# Patient Record
Sex: MALE | Race: OTHER | NOT HISPANIC OR LATINO
[De-identification: names, ages, dates, MRNs, and addresses within clinical notes are randomized per-mention and may not be internally consistent; named-entity substitution may affect disease eponyms.]

---

## 2021-07-01 PROBLEM — Z00.00 ENCOUNTER FOR PREVENTIVE HEALTH EXAMINATION: Status: ACTIVE | Noted: 2021-07-01

## 2021-07-09 ENCOUNTER — APPOINTMENT (OUTPATIENT)
Dept: UROLOGY | Facility: CLINIC | Age: 36
End: 2021-07-09
Payer: COMMERCIAL

## 2021-07-09 VITALS
HEIGHT: 68 IN | BODY MASS INDEX: 39.1 KG/M2 | WEIGHT: 258 LBS | HEART RATE: 74 BPM | SYSTOLIC BLOOD PRESSURE: 119 MMHG | DIASTOLIC BLOOD PRESSURE: 74 MMHG

## 2021-07-09 DIAGNOSIS — Z82.49 FAMILY HISTORY OF ISCHEMIC HEART DISEASE AND OTHER DISEASES OF THE CIRCULATORY SYSTEM: ICD-10-CM

## 2021-07-09 DIAGNOSIS — Z78.9 OTHER SPECIFIED HEALTH STATUS: ICD-10-CM

## 2021-07-09 PROCEDURE — 99204 OFFICE O/P NEW MOD 45 MIN: CPT

## 2021-07-09 NOTE — PHYSICAL EXAM
[General Appearance - Well Developed] : well developed [General Appearance - Well Nourished] : well nourished [Normal Appearance] : normal appearance [Well Groomed] : well groomed [General Appearance - In No Acute Distress] : no acute distress [Edema] : no peripheral edema [Respiration, Rhythm And Depth] : normal respiratory rhythm and effort [Exaggerated Use Of Accessory Muscles For Inspiration] : no accessory muscle use [Abdomen Soft] : soft [Abdomen Tenderness] : non-tender [Costovertebral Angle Tenderness] : no ~M costovertebral angle tenderness [Urethral Meatus] : meatus normal [Penis Abnormality] : normal circumcised penis [Urinary Bladder Findings] : the bladder was normal on palpation [Scrotum] : the scrotum was normal [Testes Tenderness] : no tenderness of the testes [Testes Mass (___cm)] : there were no testicular masses [Normal Station and Gait] : the gait and station were normal for the patient's age [] : no rash [No Focal Deficits] : no focal deficits [Oriented To Time, Place, And Person] : oriented to person, place, and time [Affect] : the affect was normal [Mood] : the mood was normal [Not Anxious] : not anxious [Femoral Lymph Nodes Enlarged Bilaterally] : femoral [Inguinal Lymph Nodes Enlarged Bilaterally] : inguinal [FreeTextEntry1] : Testicles 22 cc by Prader

## 2021-07-09 NOTE — END OF VISIT
[FreeTextEntry3] : I participated in the pain history, physical exam and formulating the treatment plan and agree with the above transcription by the physician's assistant

## 2021-07-09 NOTE — ASSESSMENT
[FreeTextEntry1] : History of infertility having undergone several IUI treatments as well as hCG/clomid in the past.\par They have a long Will undergo full hormone evaluation as well as scrotal ultrasound. He will obtain a semen analysis and followup for review

## 2021-07-09 NOTE — HISTORY OF PRESENT ILLNESS
[None] : no symptoms [FreeTextEntry1] : Natalio is a 35-year-old male who presents for evaluation of infertility. He has been  to his wife 32 years old , for 15 years and had been trying to conceive for approximately 6 years.\par \par 3 years ago and living in [facility] and underwent a fertility workup there. His wife states that he had poor semen quality and was started on Clomid and what we believe was hCG injections. They stated semen analysis parameters improved significantly however they had to discontinue treatment signature family issues.\par \par They have since underwent IUI. x2, most recently 2 weeks ago, which unfortunately did not result of conception. His wife states that the last post wash semen analysis was very poor although she does not have the results.\par \par She states that she has an irregular cycle and is being followed by gynecology. Additionally his sister had difficulty conceiving and she was born with one ovary and had to undergo IVF

## 2021-09-28 ENCOUNTER — APPOINTMENT (OUTPATIENT)
Dept: UROLOGY | Facility: CLINIC | Age: 36
End: 2021-09-28
Payer: COMMERCIAL

## 2021-09-28 PROCEDURE — 99214 OFFICE O/P EST MOD 30 MIN: CPT | Mod: 95

## 2021-09-28 NOTE — ASSESSMENT
[FreeTextEntry1] : Low semen volume.  Recommend transrectal ultrasound to check for any dilation of the ejaculatory ducts.  Will review blood work when they can tell us which lab he had it drawn at

## 2021-09-28 NOTE — HISTORY OF PRESENT ILLNESS
[FreeTextEntry1] : 36-year-old with inability to conceive with his wife.  He went for testicular ultrasound which showed normal testicles and 3 mm paratesticular veins but no other characterization of them.  Semen parameters include low volume, low total count and low motility.  He had blood work but he does not know which lab and we have not received the results

## 2021-10-12 ENCOUNTER — TRANSCRIPTION ENCOUNTER (OUTPATIENT)
Age: 36
End: 2021-10-12

## 2021-10-13 ENCOUNTER — APPOINTMENT (OUTPATIENT)
Dept: UROLOGY | Facility: CLINIC | Age: 36
End: 2021-10-13
Payer: COMMERCIAL

## 2021-10-13 PROCEDURE — 76872 US TRANSRECTAL: CPT

## 2021-10-20 ENCOUNTER — APPOINTMENT (OUTPATIENT)
Dept: UROLOGY | Facility: CLINIC | Age: 36
End: 2021-10-20
Payer: COMMERCIAL

## 2021-10-20 VITALS
DIASTOLIC BLOOD PRESSURE: 78 MMHG | SYSTOLIC BLOOD PRESSURE: 125 MMHG | WEIGHT: 270 LBS | BODY MASS INDEX: 40.92 KG/M2 | HEART RATE: 92 BPM | HEIGHT: 68 IN | TEMPERATURE: 98 F

## 2021-10-20 DIAGNOSIS — Z80.1 FAMILY HISTORY OF MALIGNANT NEOPLASM OF TRACHEA, BRONCHUS AND LUNG: ICD-10-CM

## 2021-10-20 DIAGNOSIS — F17.200 NICOTINE DEPENDENCE, UNSPECIFIED, UNCOMPLICATED: ICD-10-CM

## 2021-10-20 PROCEDURE — 99215 OFFICE O/P EST HI 40 MIN: CPT

## 2021-10-20 NOTE — PHYSICAL EXAM
[General Appearance - Well Developed] : well developed [General Appearance - Well Nourished] : well nourished [Normal Appearance] : normal appearance [Well Groomed] : well groomed [General Appearance - In No Acute Distress] : no acute distress [Abdomen Soft] : soft [Abdomen Tenderness] : non-tender [Costovertebral Angle Tenderness] : no ~M costovertebral angle tenderness [Penis Abnormality] : normal uncircumcised penis [Epididymis] : the epididymides were normal [Testes Tenderness] : no tenderness of the testes [Heart Rate And Rhythm] : Heart rate and rhythm were normal [Edema] : no peripheral edema [] : no respiratory distress [Respiration, Rhythm And Depth] : normal respiratory rhythm and effort [Exaggerated Use Of Accessory Muscles For Inspiration] : no accessory muscle use [Auscultation Breath Sounds / Voice Sounds] : lungs were clear to auscultation bilaterally [Oriented To Time, Place, And Person] : oriented to person, place, and time [Affect] : the affect was normal [Mood] : the mood was normal [Not Anxious] : not anxious [Normal Station and Gait] : the gait and station were normal for the patient's age [FreeTextEntry1] : Normal deep tendon reflexes

## 2021-10-20 NOTE — LETTER BODY
[Dear  ___] : Dear  [unfilled], [Courtesy Letter:] : I had the pleasure of seeing your patient, [unfilled], in my office today. [Please see my note below.] : Please see my note below. [Sincerely,] : Sincerely, [FreeTextEntry2] : Ada Valencia MD \par [address] [facility]\[place] [address]

## 2021-10-20 NOTE — LETTER HEADER
[FreeTextEntry3] : Leilani Farah M.D.\par Director of Urology\par [facility]\par [address] [place] [address], [address]\[place] [address]

## 2021-10-20 NOTE — HISTORY OF PRESENT ILLNESS
[FreeTextEntry1] : Natalio is a 36-year-old Bhutanese male who has been with Magui a 32-year-old also Bhut[place], female for 16 years.  For the last 8 or 9 years he has not used birth control unfortunately she has as of yet not conceived.  Even if that has had other partners with whom they could have had a pregnancy, they have had initial evaluation by Dr. Millan and in essence here for subspecialty opinion.  \par \par Testing to date included an ultrasound done on July 16, 2021 at [facility] unfortunately it was only done supine that showed right-sided veins that go up to 3 mm they did not discuss the degree of reflux and left-sided veins that go up to 2 mm again it was only done supine\par \par Transrectal ultrasound was done here in the office on October 13, 2021 showed a 25 g gland normal parameters no evidence of ejaculatory duct dilation\par \par Semen analysis was done at [facility] on September 16, 2021.  It showed a\par Low volume of 0.9 mL\par Low count of 26.6 million with a\par Normal concentration of 29.6 million\par Low motility at 11%\par Low grade of motility with rapid at 7 and slow at 5%.\par Morphology was borderline at 12% 4% were tapered there were no precursors.\par Palmer morphology was only 3%.\par \par There is no evidence of any erectile dysfunction.  They have sex anywhere from once a week to perhaps 2 or 3 times per week however she has PCOS and they do not know when she is ovulating so they can time there marital relations.  They had IUI\par \par They did IUI twice with her ovarian hyperstimulation unfortunately both times they were male factor and they proceeded with it regardless unfortunately was not successful the question is what can we do to improve his sperm or do they have to go to more advanced reproductive technology\par \par He has no other urologic issues no blood no pus no infection\par \par He smokes a pack every day or day and a half he knows that this is deleterious to his sperm we also discussed how it adversely affects erections\par \par When he was in [facility] he was taking Clomid I do not know the dose twice a day as well as getting a shot every 3 days and his sperm volume and everything else improved.

## 2021-10-20 NOTE — ASSESSMENT
[FreeTextEntry1] : The ultrasound was done with him supine and going to have it repeated supine and upright without and with Valsalva.  \par \par She is almost 33 so the time is not gone but time is an issue at this point.  His testosterone was low normal and they tell me Clomid worked in the past I see no reason not to try it again 1 we will look to see if he indeed has a clinical varicocele.\par \par He will go for morning bloods because the bloods we have a several months old and I want to get a second sample to make sure as well and like a bioavailable.  He will then start on Clomid 50 mg every other day he will get blood again in 3 weeks he will see me in 4 we will go over the bloods before and then on the Clomid we will go over the repeat ultrasound and they will try to get me the semen analysis from when they did the intrauterine inseminations as wall we have now is just 1 sample in 1 sample was not enough on which to make a decision

## 2021-11-22 ENCOUNTER — APPOINTMENT (OUTPATIENT)
Dept: UROLOGY | Facility: CLINIC | Age: 36
End: 2021-11-22
Payer: COMMERCIAL

## 2021-11-22 VITALS
HEART RATE: 81 BPM | BODY MASS INDEX: 41.68 KG/M2 | DIASTOLIC BLOOD PRESSURE: 81 MMHG | SYSTOLIC BLOOD PRESSURE: 123 MMHG | WEIGHT: 275 LBS | HEIGHT: 68 IN

## 2021-11-22 PROCEDURE — 99214 OFFICE O/P EST MOD 30 MIN: CPT

## 2021-11-29 ENCOUNTER — OUTPATIENT (OUTPATIENT)
Dept: OUTPATIENT SERVICES | Facility: HOSPITAL | Age: 36
LOS: 1 days | Discharge: HOME | End: 2021-11-29
Payer: MEDICAID

## 2021-11-29 ENCOUNTER — RESULT REVIEW (OUTPATIENT)
Age: 36
End: 2021-11-29

## 2021-11-29 ENCOUNTER — APPOINTMENT (OUTPATIENT)
Dept: UROLOGY | Facility: CLINIC | Age: 36
End: 2021-11-29
Payer: COMMERCIAL

## 2021-11-29 DIAGNOSIS — N46.9 MALE INFERTILITY, UNSPECIFIED: ICD-10-CM

## 2021-11-29 PROCEDURE — 76870 US EXAM SCROTUM: CPT | Mod: 26

## 2021-11-29 PROCEDURE — 99214 OFFICE O/P EST MOD 30 MIN: CPT | Mod: 95

## 2021-11-29 NOTE — LETTER BODY
[Dear  ___] : Dear  [unfilled], [Courtesy Letter:] : I had the pleasure of seeing your patient, [unfilled], in my office today. [Please see my note below.] : Please see my note below. [Sincerely,] : Sincerely, [FreeTextEntry2] : Ada Valencia MD \par [address] [facility]\[place] [address]

## 2021-11-29 NOTE — ASSESSMENT
[FreeTextEntry1] : His response is too high by about 30% so we will cut back on the Clomid switching to a whole alternating with a half continuing on an every other day basis.\par \par I was can get blood in 3 weeks and see them in a month but they will be out of the country then and given that the level was too high at 1 to wait that long.  We will therefore get bloods drawn in 2-1/2 weeks and see me before they go on the trip\par \par Please note his wife wanted something other than Clomid she does not want to do IVF and she cannot believe that in 2021 almost 2022 or we have was Clomid.\par \par I reviewed with her we have many different options that we have to tie the option to what the patient needs.  He has no obstruction, he has no varicocele, in fact his hormones were not low they were just low normal and were trying to push the envelope.  We will see how he does with the Clomid but even if we get him to maximal stimulation there is no guarantee that we will give it 6 months if in 6 months it does not work they will need to make a decision\par \par I did discuss with serologic which has some nutritional supplements that I can say will help but I can say at least what is on the label is within the bottle and there is at least anecdotal evidence that they assist.  She tells me she knows about it but for now is not interested

## 2021-11-29 NOTE — HISTORY OF PRESENT ILLNESS
[Currently Experiencing ___] :  [unfilled] [FreeTextEntry1] : Natalio is a 36-year-old Qatari male born August 9, 1985 who has been with Magui a 32-year-old also Q[place], female for 16 years.  Never seen on October 20, 2021 I wanted a repeat ultrasound because the previous one was only done supine I wanted him to take pre-Clomid blood take Clomid for 3 weeks and then get bloods again and come in.\par \par I had a problem as the pharmacy they were going to no longer took their insurance and by the time they got the prescription transferred he has only taken the medication for 2-1/2 weeks, he got blood drawn 2 days ago and is not yet available.  As well the labs they went to before he started on the Clomid did not do bioavailable.\par \par He tells me he has had no symptoms no side effects on the medication.  There has been no change in his energy levels or his degree of libido.

## 2021-11-29 NOTE — LETTER HEADER
[FreeTextEntry3] : Leilani Farah M.D.\par Director Emeritus of Urology\par [facility]\par [address] [place] [address], [address]\[place] [address]

## 2021-11-29 NOTE — LETTER BODY
[Dear  ___] : Dear  [unfilled], [Courtesy Letter:] : I had the pleasure of seeing your patient, [unfilled], in my office today. [Please see my note below.] : Please see my note below. [Sincerely,] : Sincerely, [FreeTextEntry2] : Ada Valencia MD \par [address] [facility]\[place] [address]

## 2021-11-29 NOTE — ASSESSMENT
[FreeTextEntry1] : His pre-Clomid blood test showed a midrange FSH and LH with a low normal total testosterone in the mid range free unfortunately they did not do a bioavailable.  Repeat bloods are still pending hopefully we will get them next week.\par \par As far as the ultrasound they did not do it and the question is is this something we should do as if he has a varicocele and the sperm does not get better he might want to undergo varicocelectomy.  My general viewpoint is I would rather have the information and then make a decision then wonder what if.\par \par We should get the bloods back next week I will try and meet with them as a telemedicine visit on Monday in the meantime I will reorder the Clomid though the dose may have to be adjusted and they may have to go back to the pharmacy and get another order for they may have to cut back on the dose time will tell

## 2021-11-29 NOTE — HISTORY OF PRESENT ILLNESS
[Medical Office: ([facility])___] : at the medical office located in  [Verbal consent obtained from patient] : the patient, [unfilled] [Other Location: e.g. School (Enter Location, City,State)___] : at [unfilled], at the time of the visit. [Spouse] : spouse [FreeTextEntry3] : he has received, reviewed and agreed to the telemedicine consent  [FreeTextEntry1] : Natalio is a 36-year-old Gibraltarian male born August 1935 seen last week with his wife Magui a 32-year-old [place] female.  I wanted a repeat ultrasound because the previous one was only done supine and I wanted bloods pre-Clomid the bloods on Clomid and then come in he had only taken the medication for 2-1/2 weeks so he only had blood drawn 2 days before he came in and the lab they went to before Clomid never did bioavailable he had no side effects from the medication the exam was normal and they had not done the repeat ultrasound.\par \par I had him stay on the Clomid as though the pharmacy they went to they said only give him 1 month we arranged to me today to go over the blood results that should have come in by now.  If they were willing they would go for the scrotal ultrasound.  And then set up they did bloods on November 22 I do not know about the old bloods and they did the ultrasound and we may need to review it

## 2021-11-29 NOTE — LETTER HEADER
[FreeTextEntry3] : Leilani Farah M.D.\par Director of Urology\par [facility]\par [address] [place] [address], [address]\[place] [address]

## 2021-12-22 LAB
ALBUMIN SERPL ELPH-MCNC: 4.5 G/DL
ALP BLD-CCNC: 51 U/L
ALT SERPL-CCNC: 29 U/L
AST SERPL-CCNC: 29 U/L
BASOPHILS # BLD AUTO: 0.08 K/UL
BASOPHILS NFR BLD AUTO: 0.9 %
BILIRUB DIRECT SERPL-MCNC: <0.2 MG/DL
BILIRUB INDIRECT SERPL-MCNC: >0.3 MG/DL
BILIRUB SERPL-MCNC: 0.5 MG/DL
EOSINOPHIL # BLD AUTO: 0.24 K/UL
EOSINOPHIL NFR BLD AUTO: 2.6 %
ESTRADIOL SERPL-MCNC: 62 PG/ML
FSH SERPL-MCNC: 11.9 IU/L
HCT VFR BLD CALC: 50.3 %
HGB BLD-MCNC: 16.7 G/DL
IMM GRANULOCYTES NFR BLD AUTO: 0.1 %
LH SERPL-ACNC: 15.6 IU/L
LYMPHOCYTES # BLD AUTO: 3.41 K/UL
LYMPHOCYTES NFR BLD AUTO: 37.4 %
MAN DIFF?: NORMAL
MCHC RBC-ENTMCNC: 28.5 PG
MCHC RBC-ENTMCNC: 33.2 G/DL
MCV RBC AUTO: 85.8 FL
MONOCYTES # BLD AUTO: 0.78 K/UL
MONOCYTES NFR BLD AUTO: 8.6 %
NEUTROPHILS # BLD AUTO: 4.6 K/UL
NEUTROPHILS NFR BLD AUTO: 50.4 %
PLATELET # BLD AUTO: 233 K/UL
PROT SERPL-MCNC: 7.1 G/DL
RBC # BLD: 5.86 M/UL
RBC # FLD: 13.2 %
WBC # FLD AUTO: 9.12 K/UL

## 2021-12-25 LAB
SHBG SERPL-SCNC: 19.2 NMOL/L
TESTOST BND SERPL-MCNC: 28.6 PG/ML
TESTOSTERONE TOTAL S: 800 NG/DL

## 2021-12-30 LAB
TESTOSTERONE FREE/WEAKLY BND: 370.9 NG/DL
TESTOSTERONE TOTAL S: 760 NG/DL
TESTOSTERONE, % FREE/WEAKLY BND: 48.8 %

## 2022-01-24 ENCOUNTER — APPOINTMENT (OUTPATIENT)
Dept: UROLOGY | Facility: CLINIC | Age: 37
End: 2022-01-24
Payer: COMMERCIAL

## 2022-01-24 PROCEDURE — 99214 OFFICE O/P EST MOD 30 MIN: CPT | Mod: 95

## 2022-01-24 NOTE — PHYSICAL EXAM
[General Appearance - Well Developed] : well developed [General Appearance - Well Nourished] : well nourished [Normal Appearance] : normal appearance [Well Groomed] : well groomed [General Appearance - In No Acute Distress] : no acute distress [] : no respiratory distress [Respiration, Rhythm And Depth] : normal respiratory rhythm and effort [Exaggerated Use Of Accessory Muscles For Inspiration] : no accessory muscle use [Oriented To Time, Place, And Person] : oriented to person, place, and time [Affect] : the affect was normal [Mood] : the mood was normal [Not Anxious] : not anxious [Normal Station and Gait] : the gait and station were normal for the patient's age [FreeTextEntry1] : pt says fov without change

## 2022-01-24 NOTE — ASSESSMENT
[FreeTextEntry1] : The numbers of 50% above where I would like them to be especially the bioavailable so we will drop the Clomid 1125 mg tablet take 25 every other day.  His FSH and LH are really pushing with the LH almost twice normal and the FSH the maximum\par \par I reviewed with him the situation and he is feeling a little bit more for scaling both enjoyed lubrication unfortunately too high is too high.  We will contact the Clomid we will get blood in 3 and see me in 4 weeks this time we will probably do it in the office fully recovered circular cut back.\par \par Restarted the Clomid October 28 so by the end of January it is every 3 months when I see them a month from now will be almost 4 so we will get a repeat semen analysis.  I did emphasize that the earliest ECG changes 3 can sometimes take 6 we might not see a change.\par \par they did not yet get the vitamins from [employer]ix (38137)\par \par Please note we dropped down to Doxim[employer] and she said there was a problem with her password on the [place] [employer] application [employer]

## 2022-01-24 NOTE — LETTER BODY
[Dear  ___] : Dear  [unfilled], [Courtesy Letter:] : I had the pleasure of seeing your patient, [unfilled], in my office today. [Please see my note below.] : Please see my note below. [Sincerely,] : Sincerely, [FreeTextEntry2] : Ada Valencia MD \par [address] [facility]\[place] [address]

## 2022-01-24 NOTE — HISTORY OF PRESENT ILLNESS
[Home] : at home, [unfilled] , at the time of the visit. [FreeTextEntry3] : he has received, reviewed and agreed to the telemedicine consent  [FreeTextEntry1] : Juan is a 36-year-old Sudanese male born on August 9, 1985 and who was last seen on November 29, 2021 by telemedicine.  They are having trouble conceiving he was supposed to come back on the Clomid switching to a whole alternating with a half continuing on a daily basis.  His wife had wanted something else I told her about [employer] though that is a nutritional supplement without Level One evidence.\par \par I reviewed with him that he has no obstruction no varicocele and his varicoceles were not low but low normal so we will trying to push the envelope there is no guarantee how things will help.  Wanted repeat bloods in December and follow-up before the new year but that did not work out with him when he is eating tonight to go over laboratory values.  When I contacted them his wife answered the phone he was not available.  I reviewed with them in the past the visits are with patient all the way can be there the  has to be there.  He was going to go to where he is and I will call them back.\par \par \par \par [phone]\par [email or web address]\par bloodwork in chart 12/21/21\par pt states did no other testing

## 2022-02-25 ENCOUNTER — LABORATORY RESULT (OUTPATIENT)
Age: 37
End: 2022-02-25

## 2022-02-28 ENCOUNTER — APPOINTMENT (OUTPATIENT)
Dept: UROLOGY | Facility: CLINIC | Age: 37
End: 2022-02-28

## 2022-02-28 NOTE — HISTORY OF PRESENT ILLNESS
[Home] : at home, [unfilled] , at the time of the visit. [Medical Office: ([facility])___] : at the medical office located in  [FreeTextEntry3] : he has received, reviewed and agreed to the telemedicine consent  [FreeTextEntry1] : Katie is a 36-year-old Tuvaluan  male last seen via telehealth on January 24, 2022.  His blood test was too high especially the bioavailable so he dropped his dose to 25 mg of Clomid every other day.  I had wanted him to come into the office but they decided to do with TeleMed there was no to get a semen analysis and we would see what everything showed.\par \par \par I tried counting them through the [employer] mat[facility]w I tried calling them directly with a landline and I could not get in touch with them.  They will need to call the office to come in for an appointment as telemetry meds do not seem to work well with them\par \par \par [phone]\par \par [email or web address]\par \par Biotestosterone, CBC, Sex hormone not done\par \par semen analysis, patient said he  didn’t get script, also called [facility] and they don't accept his insurance. Will need another place for semen analysis.\par

## 2022-03-04 ENCOUNTER — NON-APPOINTMENT (OUTPATIENT)
Age: 37
End: 2022-03-04

## 2022-03-28 ENCOUNTER — LABORATORY RESULT (OUTPATIENT)
Age: 37
End: 2022-03-28

## 2022-03-31 ENCOUNTER — APPOINTMENT (OUTPATIENT)
Dept: UROLOGY | Facility: CLINIC | Age: 37
End: 2022-03-31
Payer: COMMERCIAL

## 2022-03-31 VITALS
SYSTOLIC BLOOD PRESSURE: 117 MMHG | WEIGHT: 264 LBS | HEIGHT: 68 IN | DIASTOLIC BLOOD PRESSURE: 78 MMHG | BODY MASS INDEX: 40.01 KG/M2 | HEART RATE: 88 BPM

## 2022-03-31 PROCEDURE — 99214 OFFICE O/P EST MOD 30 MIN: CPT

## 2022-03-31 NOTE — ASSESSMENT
[FreeTextEntry1] : The bioavailable testosterone is minimally above normal given his other parameters and how well he is doing I will leave him on his current dose of 25 mg every other day.  He will make an appointment for semen analysis which he tells me he can get done in about 3 weeks.  We have not had Pawan 1 in over 2 months, he will continue Clomid 25 mg every other day and get blood work in 3 weeks and follow-up after for review of his semen analysis and blood work.

## 2022-03-31 NOTE — PHYSICAL EXAM

## 2022-03-31 NOTE — LETTER BODY
[Dear  ___] : Dear  [unfilled], [Courtesy Letter:] : I had the pleasure of seeing your patient, [unfilled], in my office today. [Please see my note below.] : Please see my note below. [Sincerely,] : Sincerely, [FreeTextEntry2] : Ada Valencia MD \par [address] [facility]\[place] [address]

## 2022-03-31 NOTE — HISTORY OF PRESENT ILLNESS
[Currently Experiencing ___] :  [unfilled] [FreeTextEntry1] : Natalio is a 36-year-old male we have been following for male infertility.  Currently he is managed on Clomid 25 mg every other day.  He feels good at this dose and denies any adverse events.  He was unable to get a semen analysis prior to this appointment.  He presents today to review his blood work on Clomid.

## 2022-03-31 NOTE — END OF VISIT
[FreeTextEntry3] : I, Dr. Farah, personally performed the evaluation and management (E/M) services for this established patient who presents today with (a) new problem(s)/exacerbation of (an) existing condition(s).  That E/M includes conducting the examination, assessing all new/exacerbated conditions, and establishing a new plan of care.  Today, my ACP, Robert Tomas was here to observe my evaluation and management services for this new problem/exacerbated condition to be followed going forward.

## 2022-03-31 NOTE — LETTER HEADER
[FreeTextEntry3] : Leilani Farah M.D.\par Director Emeritus of Urology\par Director of Male Infertility\par [facility]\par [address] [place] [address], [facility] [address]\[place] [address]

## 2022-05-02 ENCOUNTER — LABORATORY RESULT (OUTPATIENT)
Age: 37
End: 2022-05-02

## 2022-05-05 ENCOUNTER — APPOINTMENT (OUTPATIENT)
Dept: UROLOGY | Facility: CLINIC | Age: 37
End: 2022-05-05
Payer: COMMERCIAL

## 2022-05-05 VITALS
BODY MASS INDEX: 41.37 KG/M2 | SYSTOLIC BLOOD PRESSURE: 109 MMHG | DIASTOLIC BLOOD PRESSURE: 73 MMHG | WEIGHT: 273 LBS | HEIGHT: 68 IN | HEART RATE: 94 BPM

## 2022-05-05 PROCEDURE — 99214 OFFICE O/P EST MOD 30 MIN: CPT

## 2022-05-05 NOTE — HISTORY OF PRESENT ILLNESS
[FreeTextEntry1] : Jyoti is a 36-year-old male born on August 9, 1985 who we last saw on March 31, 2022.  He has been followed in our office for almost a year starting July 9, 2021.  He had a poor semen analysis with decreased motility decreased progression and poor morphology.  We started him on Clomid has been taking 25 mg every other day he has last refill in March including a second refill good for April he tells me he finished the March prescription a few weeks ago and did not bother refilling it.  He has hemodialysis done on May 3, blood test done on May 2 and is here for review

## 2022-05-05 NOTE — ASSESSMENT
[FreeTextEntry1] : The sperm is significantly improved, going to have him restart the Clomid and he is to stay on it until she is pregnant to they decide to stop having children.  He will see if he can get hold of his wife in case they have any questions as he prefers to talk with her rather than through \par \par Please note we will have to meet next week perhaps telemedicine as he did not get the blood until 3 days ago and I am making these decisions assuming the other bloods are normal but if they are abnormal we will have to adjust

## 2022-05-05 NOTE — LETTER BODY
[Dear  ___] : Dear  [unfilled], [Courtesy Letter:] : I had the pleasure of seeing your patient, [unfilled], in my office today. [Please see my note below.] : Please see my note below. [Sincerely,] : Sincerely, [FreeTextEntry2] : Ada Valencia MD \par [address] [facility]\[place] [address]

## 2022-05-05 NOTE — LETTER HEADER
[FreeTextEntry3] : Leilani Farah M.D.\par Director Emeritus of Urology\par Director of Male Infertility\par [facility]\par [address] [place] [address], [facility] [address]\[place] [address]

## 2022-05-09 ENCOUNTER — APPOINTMENT (OUTPATIENT)
Dept: UROLOGY | Facility: CLINIC | Age: 37
End: 2022-05-09
Payer: COMMERCIAL

## 2022-05-09 PROCEDURE — 99213 OFFICE O/P EST LOW 20 MIN: CPT | Mod: 95

## 2022-05-09 NOTE — LETTER HEADER
[FreeTextEntry3] : Leilani Farah M.D.\par Director Emeritus of Urology\par Director of Male Infertility\par [facility]\par [address] [place] [address], [facility] [address]\[place] [address]

## 2022-05-09 NOTE — ASSESSMENT
[FreeTextEntry1] : Hemoglobin was 16.7 with a platelet count of 273,000\par Liver function tests were normal\par LH was 8.1\par FSH was 5.7\par Estradiol was low at 9 (11-43)\par Total testosterone is 372 Free was 13.5 and bioavailable 150.8\par Sex hormone binding globulin was 25.4 \par \par His numbers are much improved, his hormones are normal, his wife is on the call they are going to speak to her doctor and try IUI.  They like to avoid IVF if at all possible I do not know I do not know the status of her eggs but his sperm has significantly improved and hopefully will stay improved.  However I would do this is soon as possible as 1 never knows \par \par

## 2022-05-09 NOTE — HISTORY OF PRESENT ILLNESS
[Currently Experiencing ___] :  [unfilled] [Home] : at home, [unfilled] , at the time of the visit. [Medical Office: ([facility])___] : at the medical office located in  [Spouse] : spouse [Verbal consent obtained from patient] : the patient, [unfilled] [FreeTextEntry1] : Jyoti is a 36-year-old male born on August 9, 1985 who we last saw on May 5, 2022.  The semen analysis is much improved although volume was low the hormones were not available as he did not do them until 2 days before he came in.  Were meeting today to go over the hormones and decide if we need to change the dose\par \par As long phone:[phone]\par Email: [email or web address]\par \par Pt didn't’t answer phone

## 2022-05-09 NOTE — LETTER BODY
[Dear  ___] : Dear  [unfilled], [Courtesy Letter:] : I had the pleasure of seeing your patient, [unfilled], in my office today. [Please see my note below.] : Please see my note below. [Sincerely,] : Sincerely, [FreeTextEntry2] : Ada Valencia MD \par [address] [facility]\[place] [address]

## 2022-08-08 ENCOUNTER — APPOINTMENT (OUTPATIENT)
Dept: UROLOGY | Facility: CLINIC | Age: 37
End: 2022-08-08

## 2022-08-18 ENCOUNTER — NON-APPOINTMENT (OUTPATIENT)
Age: 37
End: 2022-08-18

## 2022-08-18 LAB
ALBUMIN SERPL ELPH-MCNC: 4.7 G/DL
ALP BLD-CCNC: 64 U/L
ALT SERPL-CCNC: 26 U/L
AST SERPL-CCNC: 32 U/L
BASOPHILS # BLD AUTO: 0.08 K/UL
BASOPHILS NFR BLD AUTO: 1.1 %
BILIRUB DIRECT SERPL-MCNC: <0.2 MG/DL
BILIRUB INDIRECT SERPL-MCNC: NORMAL MG/DL
BILIRUB SERPL-MCNC: 0.5 MG/DL
EOSINOPHIL # BLD AUTO: 0.38 K/UL
EOSINOPHIL NFR BLD AUTO: 5.1 %
HCT VFR BLD CALC: 47.7 %
HGB BLD-MCNC: 15.6 G/DL
IMM GRANULOCYTES NFR BLD AUTO: 0.1 %
LYMPHOCYTES # BLD AUTO: 3.05 K/UL
LYMPHOCYTES NFR BLD AUTO: 40.6 %
MAN DIFF?: NORMAL
MCHC RBC-ENTMCNC: 28.7 PG
MCHC RBC-ENTMCNC: 32.7 G/DL
MCV RBC AUTO: 87.8 FL
MONOCYTES # BLD AUTO: 0.82 K/UL
MONOCYTES NFR BLD AUTO: 10.9 %
NEUTROPHILS # BLD AUTO: 3.17 K/UL
NEUTROPHILS NFR BLD AUTO: 42.2 %
PLATELET # BLD AUTO: 219 K/UL
PROT SERPL-MCNC: 7 G/DL
RBC # BLD: 5.43 M/UL
RBC # FLD: 13.1 %
WBC # FLD AUTO: 7.51 K/UL

## 2022-08-19 ENCOUNTER — NON-APPOINTMENT (OUTPATIENT)
Age: 37
End: 2022-08-19

## 2022-08-19 LAB
ESTRADIOL SERPL-MCNC: 16 PG/ML
FSH SERPL-MCNC: 4.9 IU/L
LH SERPL-ACNC: 8.5 IU/L

## 2022-08-22 ENCOUNTER — NON-APPOINTMENT (OUTPATIENT)
Age: 37
End: 2022-08-22

## 2022-08-22 LAB — SHBG SERPL-SCNC: 21.2 NMOL/L

## 2022-08-25 LAB
TESTOSTERONE FREE/WEAKLY BND: 190.1 NG/DL
TESTOSTERONE TOTAL S: 503 NG/DL
TESTOSTERONE, % FREE/WEAKLY BND: 37.8 %

## 2022-08-26 ENCOUNTER — APPOINTMENT (OUTPATIENT)
Dept: UROLOGY | Facility: CLINIC | Age: 37
End: 2022-08-26

## 2022-08-26 VITALS
DIASTOLIC BLOOD PRESSURE: 79 MMHG | BODY MASS INDEX: 39.56 KG/M2 | SYSTOLIC BLOOD PRESSURE: 122 MMHG | TEMPERATURE: 97.5 F | WEIGHT: 261 LBS | HEART RATE: 67 BPM | HEIGHT: 68 IN

## 2022-08-26 DIAGNOSIS — N46.9 MALE INFERTILITY, UNSPECIFIED: ICD-10-CM

## 2022-08-26 PROCEDURE — 99214 OFFICE O/P EST MOD 30 MIN: CPT

## 2022-08-26 RX ORDER — CLOMIPHENE CITRATE 50 MG/1
50 TABLET ORAL
Qty: 8 | Refills: 3 | Status: ACTIVE | COMMUNITY
Start: 2021-10-20 | End: 1900-01-01

## 2022-08-26 NOTE — ASSESSMENT
[FreeTextEntry1] : His hormones are well within normal limits.  His physical exam is within normal limits and he denies any adverse events with Clomid and will continue at 25 every other day.\par \par His sperm samples have a  low volume on all of his semen analysis however, he states that he is having a difficult time providing a sample at the office.  The last 1 was even worse and I wonder if he is getting retrograde ejaculation.  It seems there proceeding with IVF and do not really have time to do further intervention at this point so we have recommended he get nutrient rich solution from [employer] and obtain a sample at home with the help of his wife in the privacy of their home and he may get a better sample.  If he puts it into the nutritious media then the sample could be brought in an acceptable timeframe and they will get better sperm to work with.  He will consider this however, if they indeed decide to follow through with IVF it is likely that he will get another semen analysis with reproductive endocrinologist.  We request these results if and when they occur.  If they indeed find such a low volume I recommended that they do a simultaneous post ejaculatory urine analysis.  They will discuss that with the reproductive endocrinologist\par \par From our viewpoint he will follow-up 3 months with blood work before continuing with the Clomid 25 mg every other day

## 2022-08-26 NOTE — LETTER HEADER
[FreeTextEntry3] : Leilani Farah M.D.\par Director Emeritus of Urology\par Director of Male Infertility\par [facility]\par [address] [place] [address], [facility] [address]\[place] [address]

## 2022-08-26 NOTE — PHYSICAL EXAM
[General Appearance - Well Developed] : well developed [General Appearance - Well Nourished] : well nourished [Normal Appearance] : normal appearance [Well Groomed] : well groomed [General Appearance - In No Acute Distress] : no acute distress [] : no respiratory distress [Respiration, Rhythm And Depth] : normal respiratory rhythm and effort [Exaggerated Use Of Accessory Muscles For Inspiration] : no accessory muscle use [Oriented To Time, Place, And Person] : oriented to person, place, and time [Affect] : the affect was normal [Mood] : the mood was normal [Not Anxious] : not anxious [Normal Station and Gait] : the gait and station were normal for the patient's age [No Focal Deficits] : no focal deficits [FreeTextEntry1] : Mild lower extremity edema up to the ankle.

## 2022-08-26 NOTE — LETTER BODY
[Dear  ___] : Dear  [unfilled], [Courtesy Letter:] : I had the pleasure of seeing your patient, [unfilled], in my office today. [Please see my note below.] : Please see my note below. [Sincerely,] : Sincerely, [FreeTextEntry2] : Ada Valencia MD \par [address] [facility]\[place] [address]

## 2022-08-26 NOTE — HISTORY OF PRESENT ILLNESS
[Currently Experiencing ___] :  [unfilled] [FreeTextEntry1] : Jyoti is a 37-year-old male born August 9, 1985 last seen May 9, 2022 who we have been following for history of male infertility.\par \par Currently he is managed on Clomid 25 mg every other day and reports good efficacy without adverse events.\par \par He presents today to review his blood work, as well as a second semen analysis, that they did sometime in early August, although we did not order this test and he is unsure of who might ordered this.  They report that they are considering proceeding with IVF at this time and are scheduled for further consultation in the next few weeks.\par \par

## 2025-04-29 NOTE — LETTER HEADER
[FreeTextEntry3] : Leilani Farah M.D.\par Director Emeritus of Urology\par Director of Male Infertility\par [facility]\par [address] [place] [address], [facility] [address]\[place] [address] 
negative